# Patient Record
Sex: FEMALE | Race: WHITE | HISPANIC OR LATINO
[De-identification: names, ages, dates, MRNs, and addresses within clinical notes are randomized per-mention and may not be internally consistent; named-entity substitution may affect disease eponyms.]

---

## 2021-05-17 ENCOUNTER — RESULT REVIEW (OUTPATIENT)
Age: 71
End: 2021-05-17

## 2021-11-04 ENCOUNTER — OUTPATIENT (OUTPATIENT)
Dept: OUTPATIENT SERVICES | Facility: HOSPITAL | Age: 71
LOS: 1 days | End: 2021-11-04

## 2021-11-04 DIAGNOSIS — Z00.00 ENCOUNTER FOR GENERAL ADULT MEDICAL EXAMINATION WITHOUT ABNORMAL FINDINGS: ICD-10-CM

## 2021-11-04 LAB — SARS-COV-2 RNA SPEC QL NAA+PROBE: NEGATIVE — SIGNIFICANT CHANGE UP

## 2021-11-24 PROBLEM — Z00.00 ENCOUNTER FOR PREVENTIVE HEALTH EXAMINATION: Status: ACTIVE | Noted: 2021-11-24

## 2021-12-08 ENCOUNTER — NON-APPOINTMENT (OUTPATIENT)
Age: 71
End: 2021-12-08

## 2021-12-09 ENCOUNTER — APPOINTMENT (OUTPATIENT)
Dept: COLORECTAL SURGERY | Facility: CLINIC | Age: 71
End: 2021-12-09
Payer: MEDICARE

## 2021-12-09 VITALS
DIASTOLIC BLOOD PRESSURE: 73 MMHG | HEIGHT: 62 IN | TEMPERATURE: 97.3 F | BODY MASS INDEX: 26.74 KG/M2 | WEIGHT: 145.31 LBS | OXYGEN SATURATION: 99 % | SYSTOLIC BLOOD PRESSURE: 124 MMHG | HEART RATE: 63 BPM

## 2021-12-09 DIAGNOSIS — Z80.42 FAMILY HISTORY OF MALIGNANT NEOPLASM OF PROSTATE: ICD-10-CM

## 2021-12-09 DIAGNOSIS — Z78.9 OTHER SPECIFIED HEALTH STATUS: ICD-10-CM

## 2021-12-09 DIAGNOSIS — Z87.19 PERSONAL HISTORY OF OTHER DISEASES OF THE DIGESTIVE SYSTEM: ICD-10-CM

## 2021-12-09 DIAGNOSIS — K64.4 RESIDUAL HEMORRHOIDAL SKIN TAGS: ICD-10-CM

## 2021-12-09 PROCEDURE — 99202 OFFICE O/P NEW SF 15 MIN: CPT

## 2021-12-09 PROCEDURE — 46600 DIAGNOSTIC ANOSCOPY SPX: CPT

## 2021-12-09 NOTE — REVIEW OF SYSTEMS
[Negative] : Heme/Lymph [Fever] : no fever [Chills] : no chills [Feeling Poorly] : not feeling poorly [Feeling Tired] : not feeling tired [Recent Weight Gain (___ Lbs)] : no recent weight gain [Eye Pain] : no eye pain [Red Eyes] : eyes not red [Eyesight Problems] : no eyesight problems [Discharge From Eyes] : no purulent discharge from the eyes [Dry Eyes] : no dryness of the eyes [Eyes Itch] : no itching of the eyes [Earache] : no earache [Loss Of Hearing] : no hearing loss [Nosebleeds] : no nosebleeds [Nasal Discharge] : no nasal discharge [Sore Throat] : no sore throat [Hoarseness] : no hoarseness [Heart Rate Is Slow] : the heart rate was not slow [Heart Rate Is Fast] : the heart rate was not fast [Chest Pain] : no chest pain [Palpitations] : no palpitations [Leg Claudication] : no intermittent leg claudication [Lower Ext Edema] : no lower extremity edema [Shortness Of Breath] : no shortness of breath [Wheezing] : no wheezing [Cough] : no cough [SOB on Exertion] : no shortness of breath during exertion [Orthopnea] : no orthopnea [PND] : no PND [Abdominal Pain] : no abdominal pain [Vomiting] : no vomiting [Constipation] : no constipation [Diarrhea] : no diarrhea [Heartburn] : no heartburn [Melena] : no melena [Arthralgias] : no arthralgias [Joint Swelling] : no joint swelling [Joint Stiffness] : no joint stiffness [Limb Pain] : no limb pain [Limb Swelling] : no limb swelling [Confused] : no confusion [Convulsions] : no convulsions [Dizziness] : no dizziness [Fainting] : no fainting [Limb Weakness] : no limb weakness [Suicidal] : not suicidal [Sleep Disturbances] : no sleep disturbances [Anxiety] : no anxiety [Depression] : no depression [Change In Personality] : no personality change [Emotional Problems] : no emotional problems [Proptosis] : no proptosis [Hot Flashes] : no hot flashes [Muscle Weakness] : no muscle weakness [Deepening Of The Voice] : no deepening of the voice [Feelings Of Weakness] : no feelings of weakness [Easy Bleeding] : no tendency for easy bleeding [Easy Bruising] : no tendency for easy bruising [Swollen Glands] : no swollen glands [Swollen Glands In The Neck] : no swollen glands in the neck

## 2021-12-09 NOTE — HISTORY OF PRESENT ILLNESS
[FreeTextEntry1] :  ID [id number]\par \par 71 year old female who was our patient  and had hemorrhoid surgery  about 10 years ago at [facility]. \par Patient is complaining of " lumps"   and wants them removed.\par They are not tags, but are  like little "meatballs". \par No bleeding.  No prolapse.\par \par NO pain, no bleeding,  \par colonoscopy about  5 years ago by GI MD\par Bowel movements are daily, no straining. \par Medical doctor:  \par  Rafael Mendez  :  \par [phone]\par \par

## 2021-12-09 NOTE — PHYSICAL EXAM
[Nonprolapsing] : a nonprolapsing (grade I) [Skin Tags] : residual hemorrhoidal skin tags were noted [Normal] : was normal [None] : there was no rectal mass  [Normal Breath Sounds] : Normal breath sounds [Normal Heart Sounds] : normal heart sounds [No Rash or Lesion] : No rash or lesion [Alert] : alert [Oriented to Person] : oriented to person [Oriented to Place] : oriented to place [Oriented to Time] : oriented to time [Calm] : calm [Abdomen Masses] : No abdominal masses [Abdomen Tenderness] : ~T No ~M abdominal tenderness [Excoriation] : no perianal excoriation [Fistula] : no fistulas [Wart] : no warts [Ulcer ___ cm] : no ulcers [Tender, Swollen] : nontender, non-swollen [Thrombosed] : that was not thrombosed [Stool Sample Taken] : no stool obtained on rectal exam [Occult Blood Positive] : was negative for occult blood [Gross Blood] : no gross blood [Fecal Impaction] : no fecal impaction was present [de-identified] : external: few small 1 cm skin tags seen.  No large external hemorrhoids.  digital: no masses, no stenosis.  Anoscopy: grade 1 posterior internal hemorrhoid, otherwise WNL, no fissure.  No lesions seen.  [de-identified] : NAD

## 2023-07-20 ENCOUNTER — APPOINTMENT (OUTPATIENT)
Dept: VASCULAR SURGERY | Facility: CLINIC | Age: 73
End: 2023-07-20
Payer: MEDICARE

## 2023-07-20 VITALS
BODY MASS INDEX: 28.32 KG/M2 | HEIGHT: 61 IN | WEIGHT: 150 LBS | SYSTOLIC BLOOD PRESSURE: 113 MMHG | OXYGEN SATURATION: 99 % | DIASTOLIC BLOOD PRESSURE: 70 MMHG | HEART RATE: 50 BPM

## 2023-07-20 DIAGNOSIS — I83.893 VARICOSE VEINS OF BILATERAL LOWER EXTREMITIES WITH OTHER COMPLICATIONS: ICD-10-CM

## 2023-07-20 PROCEDURE — 99203 OFFICE O/P NEW LOW 30 MIN: CPT

## 2023-07-20 PROCEDURE — 93970 EXTREMITY STUDY: CPT

## 2023-07-20 NOTE — HISTORY OF PRESENT ILLNESS
[FreeTextEntry1] : 73yoF w/no significant PMHx, presents w/chronic b/l LE varicose veins that has been causing pain at the vein sites, and cramping in the thigh and calf muscles.  She denies any bleeding from her veins and denies previous treatment in the past.  Pt denies any personal or family h/o DVT/SVT.  She walks regularly w/o limitation.

## 2025-04-17 ENCOUNTER — NON-APPOINTMENT (OUTPATIENT)
Age: 75
End: 2025-04-17

## 2025-04-17 ENCOUNTER — APPOINTMENT (OUTPATIENT)
Dept: OPHTHALMOLOGY | Facility: CLINIC | Age: 75
End: 2025-04-17
Payer: MEDICARE

## 2025-04-17 PROCEDURE — 92250 FUNDUS PHOTOGRAPHY W/I&R: CPT

## 2025-04-17 PROCEDURE — 92004 COMPRE OPH EXAM NEW PT 1/>: CPT | Mod: 25

## 2025-04-17 PROCEDURE — 92136 OPHTHALMIC BIOMETRY: CPT

## 2025-04-25 PROBLEM — E66.01 MORBID OBESITY: Status: ACTIVE | Noted: 2025-04-25

## 2025-04-29 ENCOUNTER — NON-APPOINTMENT (OUTPATIENT)
Age: 75
End: 2025-04-29

## 2025-04-29 ENCOUNTER — LABORATORY RESULT (OUTPATIENT)
Age: 75
End: 2025-04-29

## 2025-04-29 ENCOUNTER — APPOINTMENT (OUTPATIENT)
Dept: BARIATRICS | Facility: CLINIC | Age: 75
End: 2025-04-29
Payer: MEDICARE

## 2025-04-29 VITALS
HEIGHT: 61.5 IN | HEART RATE: 57 BPM | WEIGHT: 163 LBS | SYSTOLIC BLOOD PRESSURE: 115 MMHG | DIASTOLIC BLOOD PRESSURE: 78 MMHG | TEMPERATURE: 97.9 F | OXYGEN SATURATION: 98 % | BODY MASS INDEX: 30.38 KG/M2

## 2025-04-29 DIAGNOSIS — Z98.84 BARIATRIC SURGERY STATUS: ICD-10-CM

## 2025-04-29 DIAGNOSIS — E66.01 MORBID (SEVERE) OBESITY DUE TO EXCESS CALORIES: ICD-10-CM

## 2025-04-29 DIAGNOSIS — K21.9 GASTRO-ESOPHAGEAL REFLUX DISEASE W/OUT ESOPHAGITIS: ICD-10-CM

## 2025-04-29 LAB
24R-OH-CALCIDIOL SERPL-MCNC: 48 PG/ML
25(OH)D3 SERPL-MCNC: 32.7 NG/ML
ALBUMIN SERPL ELPH-MCNC: 4.2 G/DL
ALP BLD-CCNC: 98 U/L
ALT SERPL-CCNC: 24 U/L
ANION GAP SERPL CALC-SCNC: 14 MMOL/L
APPEARANCE: CLEAR
APTT BLD: 32.2 SEC
AST SERPL-CCNC: 24 U/L
BACTERIA: NEGATIVE /HPF
BILIRUB SERPL-MCNC: 0.2 MG/DL
BILIRUBIN URINE: NEGATIVE
BLOOD URINE: ABNORMAL
BUN SERPL-MCNC: 22 MG/DL
CALCIUM SERPL-MCNC: 9.5 MG/DL
CALCIUM SERPL-MCNC: 9.5 MG/DL
CAST: 0 /LPF
CHLORIDE SERPL-SCNC: 103 MMOL/L
CHOLEST SERPL-MCNC: 213 MG/DL
CO2 SERPL-SCNC: 24 MMOL/L
COLOR: YELLOW
CREAT SERPL-MCNC: 0.78 MG/DL
EGFRCR SERPLBLD CKD-EPI 2021: 81 ML/MIN/1.73M2
EPITHELIAL CELLS: 0 /HPF
ESTIMATED AVERAGE GLUCOSE: 128 MG/DL
FOLATE SERPL-MCNC: 13.2 NG/ML
GLUCOSE QUALITATIVE U: NEGATIVE MG/DL
GLUCOSE SERPL-MCNC: 82 MG/DL
HBA1C MFR BLD HPLC: 6.1 %
HCT VFR BLD CALC: 40.6 %
HDLC SERPL-MCNC: 86 MG/DL
HGB BLD-MCNC: 13 G/DL
INR PPP: 0.92 RATIO
IRON SATN MFR SERPL: 19 %
IRON SERPL-MCNC: 74 UG/DL
KETONES URINE: NEGATIVE MG/DL
LDLC SERPL-MCNC: 113 MG/DL
LEUKOCYTE ESTERASE URINE: ABNORMAL
MAGNESIUM SERPL-MCNC: 2 MG/DL
MCHC RBC-ENTMCNC: 28.1 PG
MCHC RBC-ENTMCNC: 32 G/DL
MCV RBC AUTO: 87.7 FL
MICROSCOPIC-UA: NORMAL
NITRITE URINE: NEGATIVE
NONHDLC SERPL-MCNC: 126 MG/DL
PARATHYROID HORMONE INTACT: 55 PG/ML
PH URINE: 6
PHOSPHATE SERPL-MCNC: 4.1 MG/DL
PLATELET # BLD AUTO: 338 K/UL
POTASSIUM SERPL-SCNC: 4.3 MMOL/L
PREALB SERPL NEPH-MCNC: 24 MG/DL
PROT SERPL-MCNC: 6.8 G/DL
PROTEIN URINE: NORMAL MG/DL
PT BLD: 10.9 SEC
RBC # BLD: 4.63 M/UL
RBC # FLD: 14.4 %
RED BLOOD CELLS URINE: 17 /HPF
SODIUM SERPL-SCNC: 141 MMOL/L
SPECIFIC GRAVITY URINE: 1.02
TIBC SERPL-MCNC: 379 UG/DL
TRIGL SERPL-MCNC: 76 MG/DL
TSH SERPL-ACNC: 4.34 UIU/ML
UIBC SERPL-MCNC: 305 UG/DL
UROBILINOGEN URINE: 0.2 MG/DL
VIT B12 SERPL-MCNC: 1112 PG/ML
WBC # FLD AUTO: 5.79 K/UL
WHITE BLOOD CELLS URINE: 1 /HPF

## 2025-04-29 PROCEDURE — 99203 OFFICE O/P NEW LOW 30 MIN: CPT

## 2025-04-29 NOTE — ASSESSMENT
[FreeTextEntry1] : This is a 73 y/o female presents for weight loss consult. PMHx of morbid obesity (BMI 30, weight 163 lbs), HTN (not on med), prediabetes.  PSHx of gastric bypass (2005) in [facility], abdominoplasty (2006)     The patient states she has a history of gastric bypass surgery 20 years ago, losing approximately 75 pounds (lowest weight: 125 lbs, starting weight: 200 lbs) and has since regained 38 pounds. She endorses intermittent reflux and reports waking up at night several times per week choking. The patient is not currently taking medication for reflux. Denies hx of DM, aspiration, dysphagia symptoms, nausea, or vomiting. Denies any ZAC symptoms. Denies chest pain, shortness of breath, MUNIZ, or difficulty breathing, patient can climb 2 flights without stopping.  Denies hx of blood clots or bleeding problems. We discussed in detail the roles of nutrition, exercise, and lifestyle in weight management, the different medical options, endoscopic options and surgical options.   The patient is recommended dietary modifications as first-line treatment: reduce intake of fruits, juice, and dates, as they contain significant sugar and calories. Increase intake of green leafy vegetables (broccoli, zucchini, and cauliflower) and fiber. Medical weight loss options were discussed. Metformin and Contrave were offered to the patient, who declined them.  * A  (ID #[id number]) was used during this visit.

## 2025-04-29 NOTE — ASSESSMENT
[FreeTextEntry1] : This is a 75 y/o female presents for weight loss consult. PMHx of morbid obesity (BMI 30, weight 163 lbs), HTN (not on med), prediabetes.  PSHx of gastric bypass (2005) in [facility], abdominoplasty (2006)     The patient states she has a history of gastric bypass surgery 20 years ago, losing approximately 75 pounds (lowest weight: 125 lbs, starting weight: 200 lbs) and has since regained 38 pounds. She endorses intermittent reflux and reports waking up at night several times per week choking. The patient is not currently taking medication for reflux. Denies hx of DM, aspiration, dysphagia symptoms, nausea, or vomiting. Denies any ZAC symptoms. Denies chest pain, shortness of breath, MUNIZ, or difficulty breathing, patient can climb 2 flights without stopping.  Denies hx of blood clots or bleeding problems. We discussed in detail the roles of nutrition, exercise, and lifestyle in weight management, the different medical options, endoscopic options and surgical options.   The patient is recommended dietary modifications as first-line treatment: reduce intake of fruits, juice, and dates, as they contain significant sugar and calories. Increase intake of green leafy vegetables (broccoli, zucchini, and cauliflower) and fiber. Medical weight loss options were discussed. Metformin and Contrave were offered to the patient, who declined them.  * A  (ID #[id number]) was used during this visit.

## 2025-04-29 NOTE — HISTORY OF PRESENT ILLNESS
[de-identified] : This is a 73 y/o female presents for weight loss consult. PMHx of morbid obesity (BMI 30, weight 163 lbs), HTN (not on med), prediabetes.  PSHx of gastric bypass (2005) in [facility], abdominoplasty (2006)     The patient states she has a history of gastric bypass surgery 20 years ago, losing approximately 75 pounds (lowest weight: 125 lbs, starting weight: 200 lbs) and has since regained 38 pounds. She endorses intermittent reflux and reports waking up at night several times per week choking. The patient is not currently taking medication for reflux. Denies hx of DM, aspiration, dysphagia symptoms, nausea, or vomiting. Denies any ZAC symptoms. Denies chest pain, shortness of breath, MUNIZ, or difficulty breathing, patient can climb 2 flights without stopping.  Denies hx of blood clots or bleeding problems. We discussed in detail the roles of nutrition, exercise, and lifestyle in weight management, the different medical options, endoscopic options and surgical options.

## 2025-04-29 NOTE — HISTORY OF PRESENT ILLNESS
[de-identified] : This is a 73 y/o female presents for weight loss consult. PMHx of morbid obesity (BMI 30, weight 163 lbs), HTN (not on med), prediabetes.  PSHx of gastric bypass (2005) in [facility], abdominoplasty (2006)     The patient states she has a history of gastric bypass surgery 20 years ago, losing approximately 75 pounds (lowest weight: 125 lbs, starting weight: 200 lbs) and has since regained 38 pounds. She endorses intermittent reflux and reports waking up at night several times per week choking. The patient is not currently taking medication for reflux. Denies hx of DM, aspiration, dysphagia symptoms, nausea, or vomiting. Denies any ZAC symptoms. Denies chest pain, shortness of breath, MUNIZ, or difficulty breathing, patient can climb 2 flights without stopping.  Denies hx of blood clots or bleeding problems. We discussed in detail the roles of nutrition, exercise, and lifestyle in weight management, the different medical options, endoscopic options and surgical options.

## 2025-04-29 NOTE — REASON FOR VISIT
[Initial Consult] : an initial consult for [Morbid Obesity (BMI>40)] : morbid obesity (bmi>40) [Morbid Obesity (BMI<40)] : morbid obesity (bmi<40)

## 2025-04-29 NOTE — HISTORY OF PRESENT ILLNESS
[de-identified] : This is a 73 y/o female presents for weight loss consult. PMHx of morbid obesity (BMI 30, weight 163 lbs), HTN (not on med), prediabetes.  PSHx of gastric bypass (2005) in [facility], abdominoplasty (2006)     The patient states she has a history of gastric bypass surgery 20 years ago, losing approximately 75 pounds (lowest weight: 125 lbs, starting weight: 200 lbs) and has since regained 38 pounds. She endorses intermittent reflux and reports waking up at night several times per week choking. The patient is not currently taking medication for reflux. Denies hx of DM, aspiration, dysphagia symptoms, nausea, or vomiting. Denies any ZAC symptoms. Denies chest pain, shortness of breath, MUNIZ, or difficulty breathing, patient can climb 2 flights without stopping.  Denies hx of blood clots or bleeding problems. We discussed in detail the roles of nutrition, exercise, and lifestyle in weight management, the different medical options, endoscopic options and surgical options.

## 2025-04-29 NOTE — PLAN
[FreeTextEntry1] : Obesity (BMI 30): - Obtain bariatric labs. Follow up in 1 month with the NP to review labs and correct vitamin levels as necessary. - The patient is recommended dietary modifications as first-line treatment: reduce intake of fruits, juice, and dates, as they contain significant sugar and calories.  - Medical weight loss options were discussed. The patient declined them at this time. - Continue to follow up with GI specialist for reflux management.

## 2025-04-29 NOTE — ADDENDUM
[FreeTextEntry1] :  I, VIKRAM JORDAN, am scribing for and in the presence of Dr. Mcdaniel for the following sections: HISTORY OF PRESENT ILLNESS, PAST MEDICAL HISTORY, PAST SURGICAL HISTORY, FAMILY/SOCIAL HISTORY, REVIEW OF SYSTEMS, VITAL SIGNS, PHYSICAL EXAM, DISPOSITION. Reviewed current treatment plan, including medications / surgical intervention / lifestyle modifications where indicated. Reviewed imaging, laboratory results, or other diagnostics as applicable. Addressed patient concerns, including potential complications, risk factors, or alternative treatment options as applicable. Provided detailed education on pre- or post-operative instructions,and expected outcome as applicable. Instructed pt to call office for questions or concerns. Instructed patient to schedule follow-up appointment as recommended. Referrals and testing ordered where indicated. Plan of care reviewed with patient, and questions answered. The total time spent with patient included more than 50% of the time dedicated to counseling and coordination of care.

## 2025-04-29 NOTE — ASSESSMENT
[FreeTextEntry1] : This is a 75 y/o female presents for weight loss consult. PMHx of morbid obesity (BMI 30, weight 163 lbs), HTN (not on med), prediabetes.  PSHx of gastric bypass (2005) in [facility], abdominoplasty (2006)     The patient states she has a history of gastric bypass surgery 20 years ago, losing approximately 75 pounds (lowest weight: 125 lbs, starting weight: 200 lbs) and has since regained 38 pounds. She endorses intermittent reflux and reports waking up at night several times per week choking. The patient is not currently taking medication for reflux. Denies hx of DM, aspiration, dysphagia symptoms, nausea, or vomiting. Denies any ZAC symptoms. Denies chest pain, shortness of breath, MUNIZ, or difficulty breathing, patient can climb 2 flights without stopping.  Denies hx of blood clots or bleeding problems. We discussed in detail the roles of nutrition, exercise, and lifestyle in weight management, the different medical options, endoscopic options and surgical options.   The patient is recommended dietary modifications as first-line treatment: reduce intake of fruits, juice, and dates, as they contain significant sugar and calories. Increase intake of green leafy vegetables (broccoli, zucchini, and cauliflower) and fiber. Medical weight loss options were discussed. Metformin and Contrave were offered to the patient, who declined them.  * A  (ID #[id number]) was used during this visit.

## 2025-04-29 NOTE — END OF VISIT
[FreeTextEntry3] : Nadya Rivera NP, scribed for me, Dr. Eleazar Mcdaniel, in my presence and the the presence of MIRIAM DUKE either physically or via video.  The scribe documented the following sections when they are included: HISTORY OF PRESENT ILLNESS, PAST MEDICAL/SURGICAL/FAMILY/SOCIAL HISTORY; REVIEW OF SYSTEMS; VITAL SIGNS; PHYSICAL EXAM; DISPOSITION as well as ASSESSMENT and PLAN.   I confirm that the documentation above accurately reflects my interaction with the patient MIRIAM DUKE.   I spent __30__ minutes in the care of this patient including preparation, chart review, face to face time, imaging and or test results review and documentation.

## 2025-04-30 LAB — CA-I SERPL-SCNC: 5.2 MG/DL

## 2025-05-01 LAB — ZINC SERPL-MCNC: 65 UG/DL

## 2025-05-02 LAB
VIT A SERPL-MCNC: 54.8 UG/DL
VIT B1 SERPL-MCNC: 87.4 NMOL/L
VIT B6 SERPL-MCNC: 6.1 UG/L
VIT C SERPL-MCNC: 1.2 MG/DL

## 2025-05-06 LAB — VIT B2 SERPL-MCNC: 241 UG/L

## 2025-05-11 LAB — MENADIONE SERPL-MCNC: <0.1 NG/ML

## 2025-05-15 ENCOUNTER — NON-APPOINTMENT (OUTPATIENT)
Age: 75
End: 2025-05-15

## 2025-06-12 ENCOUNTER — APPOINTMENT (OUTPATIENT)
Dept: OPHTHALMOLOGY | Facility: AMBULATORY SURGERY CENTER | Age: 75
End: 2025-06-12

## 2025-06-13 ENCOUNTER — APPOINTMENT (OUTPATIENT)
Dept: OPHTHALMOLOGY | Facility: CLINIC | Age: 75
End: 2025-06-13

## 2025-06-19 ENCOUNTER — APPOINTMENT (OUTPATIENT)
Dept: OPHTHALMOLOGY | Facility: CLINIC | Age: 75
End: 2025-06-19

## 2025-07-03 ENCOUNTER — APPOINTMENT (OUTPATIENT)
Dept: OPHTHALMOLOGY | Facility: CLINIC | Age: 75
End: 2025-07-03

## 2025-07-17 ENCOUNTER — APPOINTMENT (OUTPATIENT)
Age: 75
End: 2025-07-17

## 2025-07-17 PROCEDURE — 93000 ELECTROCARDIOGRAM COMPLETE: CPT | Mod: NC

## 2025-07-17 PROCEDURE — 99204 OFFICE O/P NEW MOD 45 MIN: CPT | Mod: 25

## 2025-08-08 DIAGNOSIS — I49.3 VENTRICULAR PREMATURE DEPOLARIZATION: ICD-10-CM

## 2025-08-18 ENCOUNTER — APPOINTMENT (OUTPATIENT)
Dept: HEART AND VASCULAR | Facility: CLINIC | Age: 75
End: 2025-08-18
Payer: MEDICARE

## 2025-08-18 PROCEDURE — 93306 TTE W/DOPPLER COMPLETE: CPT

## 2025-09-11 ENCOUNTER — APPOINTMENT (OUTPATIENT)
Dept: OPHTHALMOLOGY | Facility: AMBULATORY SURGERY CENTER | Age: 75
End: 2025-09-11

## 2025-09-12 ENCOUNTER — APPOINTMENT (OUTPATIENT)
Dept: OPHTHALMOLOGY | Facility: CLINIC | Age: 75
End: 2025-09-12

## 2025-09-18 ENCOUNTER — APPOINTMENT (OUTPATIENT)
Dept: OPHTHALMOLOGY | Facility: CLINIC | Age: 75
End: 2025-09-18